# Patient Record
Sex: FEMALE
[De-identification: names, ages, dates, MRNs, and addresses within clinical notes are randomized per-mention and may not be internally consistent; named-entity substitution may affect disease eponyms.]

---

## 2018-08-22 ENCOUNTER — RECORDS - HEALTHEAST (OUTPATIENT)
Dept: ADMINISTRATIVE | Facility: OTHER | Age: 48
End: 2018-08-22

## 2021-09-01 ENCOUNTER — PATIENT OUTREACH (OUTPATIENT)
Dept: GASTROENTEROLOGY | Facility: CLINIC | Age: 51
End: 2021-09-01

## 2021-09-01 NOTE — TELEPHONE ENCOUNTER
Pt called in, says her MD sent a referral for her to f/ up with Dr. Santos, last seen in clinic in 2011. No referral records received yet. Left message/    ML

## 2021-09-23 ENCOUNTER — TRANSCRIBE ORDERS (OUTPATIENT)
Dept: OTHER | Age: 51
End: 2021-09-23

## 2021-09-23 DIAGNOSIS — R10.9 RIGHT FLANK PAIN: Primary | ICD-10-CM

## 2021-10-15 ENCOUNTER — PATIENT OUTREACH (OUTPATIENT)
Dept: GASTROENTEROLOGY | Facility: CLINIC | Age: 51
End: 2021-10-15

## 2022-03-03 ENCOUNTER — TRANSFERRED RECORDS (OUTPATIENT)
Dept: HEALTH INFORMATION MANAGEMENT | Facility: CLINIC | Age: 52
End: 2022-03-03
Payer: COMMERCIAL

## 2022-03-03 LAB
ALT SERPL-CCNC: 31 U/L (ref 12–78)
AST SERPL-CCNC: 16 U/L (ref 15–37)
CREATININE (EXTERNAL): 0.95 MG/DL (ref 0.6–1.3)
GFR ESTIMATED (EXTERNAL): 62 ML/MIN/1.73M2
GLUCOSE (EXTERNAL): 82 MG/DL (ref 70–99)
POTASSIUM (EXTERNAL): 4 MMOL/L (ref 3.5–5.1)

## 2022-03-06 ENCOUNTER — MEDICAL CORRESPONDENCE (OUTPATIENT)
Dept: HEALTH INFORMATION MANAGEMENT | Facility: CLINIC | Age: 52
End: 2022-03-06
Payer: COMMERCIAL

## 2022-03-07 ENCOUNTER — MEDICAL CORRESPONDENCE (OUTPATIENT)
Dept: HEALTH INFORMATION MANAGEMENT | Facility: CLINIC | Age: 52
End: 2022-03-07
Payer: COMMERCIAL

## 2022-03-08 ENCOUNTER — PATIENT OUTREACH (OUTPATIENT)
Dept: GASTROENTEROLOGY | Facility: CLINIC | Age: 52
End: 2022-03-08
Payer: COMMERCIAL

## 2022-03-08 NOTE — TELEPHONE ENCOUNTER
Returned patient call related to getting OV with Dr. Santos. Last seen in 2011, with EUS, 2/9 for Chronic pancreatitis.     Recently diagnosis with Mesenteric adenitis   In January 2022. Went back to ER for pain related to that. 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 Pain under bellybutton, burning like someone is pulling on her belly button from the inside.  Pain with eating, can only eat bland food, sometimes can't eat due to pain, water is OK.     Labs WNL on 3/3. CT w/ no pancreatic/biliary findings. Pt had  sleeve gastrectomy after last visit with Tom in 2011.     Has referred to different GI but they haven't called her yet. Told pt to follow op on that referral but that I would check with Dr. Santos regarding follow up.     ML

## 2022-03-14 ENCOUNTER — TELEPHONE (OUTPATIENT)
Dept: GASTROENTEROLOGY | Facility: CLINIC | Age: 52
End: 2022-03-14
Payer: COMMERCIAL

## 2022-03-14 NOTE — TELEPHONE ENCOUNTER
Per Dr. Santos,  Ok to see in clinic    Referral for: R flank pain.    Called Pt to schedule an appointment with Dr. Santos. Per Pt, she is already scheduled with another GI provider. Pt deferred getting scheduled with Dr. Santos.    Javad Norton LPN